# Patient Record
Sex: FEMALE | URBAN - METROPOLITAN AREA
[De-identification: names, ages, dates, MRNs, and addresses within clinical notes are randomized per-mention and may not be internally consistent; named-entity substitution may affect disease eponyms.]

---

## 2021-10-29 ENCOUNTER — OFFICE VISIT (OUTPATIENT)
Dept: URBAN - METROPOLITAN AREA CLINIC 43 | Facility: CLINIC | Age: 41
End: 2021-10-29
Payer: COMMERCIAL

## 2021-10-29 DIAGNOSIS — E11.9 TYPE 2 DIABETES MELLITUS W/O COMPLICATION: Primary | ICD-10-CM

## 2021-10-29 DIAGNOSIS — H04.209 EPIPHORA: ICD-10-CM

## 2021-10-29 DIAGNOSIS — G24.5 BLEPHAROSPASM: ICD-10-CM

## 2021-10-29 DIAGNOSIS — Z79.84 LONG TERM (CURRENT) USE OF ORAL HYPOGLYCEMIC DRUGS: ICD-10-CM

## 2021-10-29 PROCEDURE — 99204 OFFICE O/P NEW MOD 45 MIN: CPT | Performed by: OPTOMETRIST

## 2021-10-29 ASSESSMENT — INTRAOCULAR PRESSURE
OD: 16
OS: 16

## 2021-10-29 ASSESSMENT — KERATOMETRY
OS: 42.50
OD: 43.25

## 2021-10-29 NOTE — IMPRESSION/PLAN
Impression: Epiphora: H04.209.  Plan: OD only, pt denies itchiness/burning, refer for NLDO to oculoplastics

## 2021-10-29 NOTE — IMPRESSION/PLAN
Impression: Type 2 diabetes mellitus w/o complication: E90.3. Plan: Diabetes type II: no background retinopathy, no signs of neovascularization noted. Discussed ocular and systemic benefits of blood sugar control.

## 2021-10-29 NOTE — IMPRESSION/PLAN
Impression: Blepharospasm: G24.5.  Plan: Pt complains of constant twitching of upper lid for over a month, observed in office, refer for oculoplastics consult

## 2021-11-16 ENCOUNTER — OFFICE VISIT (OUTPATIENT)
Dept: URBAN - METROPOLITAN AREA CLINIC 34 | Facility: CLINIC | Age: 41
End: 2021-11-16
Payer: COMMERCIAL

## 2021-11-16 DIAGNOSIS — H04.221 EPIPHORA DUE TO INSUFFICIENT DRAINAGE, RIGHT EYE: Primary | ICD-10-CM

## 2021-11-16 PROCEDURE — 68440 SNIP INC LACRIMAL PUNCTUM: CPT | Performed by: OPHTHALMOLOGY

## 2021-11-16 PROCEDURE — 68810 PROBE NASOLACRIMAL DUCT: CPT | Performed by: OPHTHALMOLOGY

## 2021-11-16 PROCEDURE — 99204 OFFICE O/P NEW MOD 45 MIN: CPT | Performed by: OPHTHALMOLOGY

## 2021-11-16 PROCEDURE — 92285 EXTERNAL OCULAR PHOTOGRAPHY: CPT | Performed by: OPHTHALMOLOGY

## 2021-11-16 RX ORDER — NEOMYCIN SULFATE, POLYMYXIN B SULFATE AND DEXAMETHASONE 3.5; 10000; 1 MG/ML; [USP'U]/ML; MG/ML
SUSPENSION OPHTHALMIC
Qty: 5 | Refills: 1 | Status: ACTIVE
Start: 2021-11-16

## 2021-11-16 NOTE — IMPRESSION/PLAN
Impression: Epiphora due to insufficient drainage, right eye: H04.221. Plan: Excess tearing, irrigated today, outflow normal.  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 Risks, benefits, and alternatives (including no surgery) discussed with patient.